# Patient Record
Sex: FEMALE | Race: WHITE | NOT HISPANIC OR LATINO | Employment: FULL TIME | URBAN - METROPOLITAN AREA
[De-identification: names, ages, dates, MRNs, and addresses within clinical notes are randomized per-mention and may not be internally consistent; named-entity substitution may affect disease eponyms.]

---

## 2017-01-02 ENCOUNTER — HOSPITAL ENCOUNTER (OUTPATIENT)
Dept: RADIOLOGY | Facility: CLINIC | Age: 52
Discharge: HOME | End: 2017-01-02
Attending: SURGERY

## 2017-01-02 DIAGNOSIS — Z41.9 SURGERY, ELECTIVE: ICD-10-CM

## 2017-01-03 ENCOUNTER — ANESTHESIA - HEALTHEAST (OUTPATIENT)
Dept: SURGERY | Facility: CLINIC | Age: 52
End: 2017-01-03

## 2017-01-03 ENCOUNTER — COMMUNICATION - HEALTHEAST (OUTPATIENT)
Dept: NEUROSURGERY | Facility: CLINIC | Age: 52
End: 2017-01-03

## 2017-01-03 ENCOUNTER — OFFICE VISIT - HEALTHEAST (OUTPATIENT)
Dept: INTERNAL MEDICINE | Facility: CLINIC | Age: 52
End: 2017-01-03

## 2017-01-03 DIAGNOSIS — Z01.818 PREOP EXAM FOR INTERNAL MEDICINE: ICD-10-CM

## 2017-01-03 DIAGNOSIS — F41.9 ANXIETY: ICD-10-CM

## 2017-01-03 DIAGNOSIS — G95.9 CERVICAL MYELOPATHY (H): ICD-10-CM

## 2017-01-03 ASSESSMENT — MIFFLIN-ST. JEOR: SCORE: 1712.19

## 2017-01-04 ENCOUNTER — SURGERY - HEALTHEAST (OUTPATIENT)
Dept: SURGERY | Facility: CLINIC | Age: 52
End: 2017-01-04

## 2017-01-04 LAB
ATRIAL RATE - MUSE: 64 BPM
DIASTOLIC BLOOD PRESSURE - MUSE: NORMAL MMHG
INTERPRETATION ECG - MUSE: NORMAL
P AXIS - MUSE: 62 DEGREES
PR INTERVAL - MUSE: 144 MS
QRS DURATION - MUSE: 86 MS
QT - MUSE: 446 MS
QTC - MUSE: 460 MS
R AXIS - MUSE: 59 DEGREES
SYSTOLIC BLOOD PRESSURE - MUSE: NORMAL MMHG
T AXIS - MUSE: 30 DEGREES
VENTRICULAR RATE- MUSE: 64 BPM

## 2017-01-04 ASSESSMENT — MIFFLIN-ST. JEOR
SCORE: 1698.29
SCORE: 1709.92

## 2017-01-05 ASSESSMENT — MIFFLIN-ST. JEOR: SCORE: 1710.37

## 2017-01-06 ENCOUNTER — COMMUNICATION - HEALTHEAST (OUTPATIENT)
Dept: NEUROSURGERY | Facility: CLINIC | Age: 52
End: 2017-01-06

## 2017-01-12 ENCOUNTER — COMMUNICATION - HEALTHEAST (OUTPATIENT)
Dept: NEUROSURGERY | Facility: CLINIC | Age: 52
End: 2017-01-12

## 2017-01-12 DIAGNOSIS — G95.9 CERVICAL MYELOPATHY (H): ICD-10-CM

## 2017-01-12 RX ORDER — CYCLOBENZAPRINE HCL 10 MG
5 TABLET ORAL 3 TIMES DAILY PRN
Qty: 30 TABLET | Refills: 0 | Status: SHIPPED | OUTPATIENT
Start: 2017-01-12 | End: 2024-09-20

## 2017-01-20 ENCOUNTER — COMMUNICATION - HEALTHEAST (OUTPATIENT)
Dept: NEUROSURGERY | Facility: CLINIC | Age: 52
End: 2017-01-20

## 2017-01-20 DIAGNOSIS — G95.20 CERVICAL SPINAL CORD COMPRESSION (H): ICD-10-CM

## 2017-01-23 ENCOUNTER — AMBULATORY - HEALTHEAST (OUTPATIENT)
Dept: NEUROSURGERY | Facility: CLINIC | Age: 52
End: 2017-01-23

## 2017-01-23 DIAGNOSIS — Z48.02 REMOVAL OF STAPLES: ICD-10-CM

## 2017-01-23 DIAGNOSIS — G95.9 CERVICAL MYELOPATHY (H): ICD-10-CM

## 2017-01-23 RX ORDER — ACETAMINOPHEN 500 MG
1000 TABLET ORAL EVERY 6 HOURS PRN
Status: SHIPPED | COMMUNITY
Start: 2017-01-23 | End: 2024-09-20

## 2017-02-03 ENCOUNTER — COMMUNICATION - HEALTHEAST (OUTPATIENT)
Dept: NEUROSURGERY | Facility: CLINIC | Age: 52
End: 2017-02-03

## 2017-02-03 DIAGNOSIS — G95.20 CERVICAL CORD COMPRESSION WITH MYELOPATHY (H): ICD-10-CM

## 2017-02-13 ENCOUNTER — HOSPITAL ENCOUNTER (OUTPATIENT)
Dept: RADIOLOGY | Facility: CLINIC | Age: 52
Discharge: HOME | End: 2017-02-13
Attending: SURGERY

## 2017-02-13 ENCOUNTER — OFFICE VISIT - HEALTHEAST (OUTPATIENT)
Dept: NEUROSURGERY | Facility: CLINIC | Age: 52
End: 2017-02-13

## 2017-02-13 DIAGNOSIS — M48.02 CERVICAL STENOSIS OF SPINE: ICD-10-CM

## 2017-02-13 DIAGNOSIS — G95.9 CERVICAL MYELOPATHY (H): ICD-10-CM

## 2017-02-13 RX ORDER — IBUPROFEN 200 MG
600 TABLET ORAL EVERY 4 HOURS PRN
Status: SHIPPED | COMMUNITY
Start: 2017-02-13

## 2017-02-13 ASSESSMENT — MIFFLIN-ST. JEOR: SCORE: 1664.56

## 2017-02-21 ENCOUNTER — OFFICE VISIT - HEALTHEAST (OUTPATIENT)
Dept: PHYSICAL THERAPY | Facility: REHABILITATION | Age: 52
End: 2017-02-21

## 2017-02-21 DIAGNOSIS — M62.81 GENERALIZED MUSCLE WEAKNESS: ICD-10-CM

## 2017-02-21 DIAGNOSIS — M54.2 NECK PAIN, ACUTE: ICD-10-CM

## 2017-02-28 ENCOUNTER — COMMUNICATION - HEALTHEAST (OUTPATIENT)
Dept: NEUROSURGERY | Facility: CLINIC | Age: 52
End: 2017-02-28

## 2017-02-28 ENCOUNTER — OFFICE VISIT - HEALTHEAST (OUTPATIENT)
Dept: PHYSICAL THERAPY | Facility: REHABILITATION | Age: 52
End: 2017-02-28

## 2017-02-28 DIAGNOSIS — M62.81 GENERALIZED MUSCLE WEAKNESS: ICD-10-CM

## 2017-02-28 DIAGNOSIS — M54.2 NECK PAIN, ACUTE: ICD-10-CM

## 2017-03-01 ENCOUNTER — OFFICE VISIT - HEALTHEAST (OUTPATIENT)
Dept: INTERNAL MEDICINE | Facility: CLINIC | Age: 52
End: 2017-03-01

## 2017-03-01 DIAGNOSIS — Z00.00 HEALTH CARE MAINTENANCE: ICD-10-CM

## 2017-03-01 DIAGNOSIS — F41.1 GENERALIZED ANXIETY DISORDER: ICD-10-CM

## 2017-03-01 ASSESSMENT — MIFFLIN-ST. JEOR: SCORE: 1661.84

## 2017-03-02 ENCOUNTER — OFFICE VISIT - HEALTHEAST (OUTPATIENT)
Dept: PHYSICAL THERAPY | Facility: REHABILITATION | Age: 52
End: 2017-03-02

## 2017-03-02 DIAGNOSIS — M62.81 GENERALIZED MUSCLE WEAKNESS: ICD-10-CM

## 2017-03-02 DIAGNOSIS — M54.2 NECK PAIN, ACUTE: ICD-10-CM

## 2017-03-03 ENCOUNTER — COMMUNICATION - HEALTHEAST (OUTPATIENT)
Dept: NEUROSURGERY | Facility: CLINIC | Age: 52
End: 2017-03-03

## 2017-03-09 ENCOUNTER — OFFICE VISIT - HEALTHEAST (OUTPATIENT)
Dept: PHYSICAL THERAPY | Facility: REHABILITATION | Age: 52
End: 2017-03-09

## 2017-03-09 DIAGNOSIS — M62.81 MUSCLE WEAKNESS (GENERALIZED): ICD-10-CM

## 2017-03-09 DIAGNOSIS — M54.2 NECK PAIN, ACUTE: ICD-10-CM

## 2017-03-09 DIAGNOSIS — M54.16 LUMBAR RADICULOPATHY: ICD-10-CM

## 2017-03-09 DIAGNOSIS — M62.81 GENERALIZED MUSCLE WEAKNESS: ICD-10-CM

## 2017-03-14 ENCOUNTER — OFFICE VISIT - HEALTHEAST (OUTPATIENT)
Dept: PHYSICAL THERAPY | Facility: REHABILITATION | Age: 52
End: 2017-03-14

## 2017-03-14 DIAGNOSIS — M62.81 MUSCLE WEAKNESS (GENERALIZED): ICD-10-CM

## 2017-03-14 DIAGNOSIS — M54.16 LUMBAR RADICULOPATHY: ICD-10-CM

## 2017-03-14 DIAGNOSIS — M62.81 GENERALIZED MUSCLE WEAKNESS: ICD-10-CM

## 2017-03-14 DIAGNOSIS — M54.2 NECK PAIN, ACUTE: ICD-10-CM

## 2017-03-16 ENCOUNTER — OFFICE VISIT - HEALTHEAST (OUTPATIENT)
Dept: PHYSICAL THERAPY | Facility: REHABILITATION | Age: 52
End: 2017-03-16

## 2017-03-16 DIAGNOSIS — M53.86 DECREASED ROM OF LUMBAR SPINE: ICD-10-CM

## 2017-03-16 DIAGNOSIS — M54.2 NECK PAIN, ACUTE: ICD-10-CM

## 2017-03-16 DIAGNOSIS — M54.50 ACUTE LOW BACK PAIN DUE TO TRAUMA: ICD-10-CM

## 2017-03-16 DIAGNOSIS — M62.81 MUSCLE WEAKNESS (GENERALIZED): ICD-10-CM

## 2017-03-16 DIAGNOSIS — M62.830 SPASM OF LUMBAR PARASPINOUS MUSCLE: ICD-10-CM

## 2017-03-16 DIAGNOSIS — M53.3 SACROILIAC DYSFUNCTION: ICD-10-CM

## 2017-03-16 DIAGNOSIS — M54.16 LUMBAR RADICULOPATHY: ICD-10-CM

## 2017-03-16 DIAGNOSIS — M62.81 GENERALIZED MUSCLE WEAKNESS: ICD-10-CM

## 2017-03-16 DIAGNOSIS — M79.18 MYOFASCIAL PAIN: ICD-10-CM

## 2017-03-16 DIAGNOSIS — G89.11 ACUTE LOW BACK PAIN DUE TO TRAUMA: ICD-10-CM

## 2017-03-22 ENCOUNTER — COMMUNICATION - HEALTHEAST (OUTPATIENT)
Dept: INTERNAL MEDICINE | Facility: CLINIC | Age: 52
End: 2017-03-22

## 2017-06-27 ENCOUNTER — COMMUNICATION - HEALTHEAST (OUTPATIENT)
Dept: SCHEDULING | Facility: CLINIC | Age: 52
End: 2017-06-27

## 2017-06-28 ENCOUNTER — OFFICE VISIT - HEALTHEAST (OUTPATIENT)
Dept: INTERNAL MEDICINE | Facility: CLINIC | Age: 52
End: 2017-06-28

## 2017-06-28 DIAGNOSIS — F41.1 GENERALIZED ANXIETY DISORDER: ICD-10-CM

## 2017-06-28 DIAGNOSIS — G95.9 CERVICAL MYELOPATHY (H): ICD-10-CM

## 2017-06-28 DIAGNOSIS — J45.909 ASTHMA: ICD-10-CM

## 2017-07-10 ENCOUNTER — COMMUNICATION - HEALTHEAST (OUTPATIENT)
Dept: SCHEDULING | Facility: CLINIC | Age: 52
End: 2017-07-10

## 2017-07-10 ENCOUNTER — COMMUNICATION - HEALTHEAST (OUTPATIENT)
Dept: INTERNAL MEDICINE | Facility: CLINIC | Age: 52
End: 2017-07-10

## 2017-07-17 ENCOUNTER — COMMUNICATION - HEALTHEAST (OUTPATIENT)
Dept: INTERNAL MEDICINE | Facility: CLINIC | Age: 52
End: 2017-07-17

## 2017-07-17 DIAGNOSIS — G47.9 SLEEP DIFFICULTIES: ICD-10-CM

## 2017-07-31 ENCOUNTER — COMMUNICATION - HEALTHEAST (OUTPATIENT)
Dept: INTERNAL MEDICINE | Facility: CLINIC | Age: 52
End: 2017-07-31

## 2017-07-31 DIAGNOSIS — F41.9 ANXIETY: ICD-10-CM

## 2017-08-04 ENCOUNTER — RECORDS - HEALTHEAST (OUTPATIENT)
Dept: ADMINISTRATIVE | Facility: OTHER | Age: 52
End: 2017-08-04

## 2017-08-18 ENCOUNTER — COMMUNICATION - HEALTHEAST (OUTPATIENT)
Dept: INTERNAL MEDICINE | Facility: CLINIC | Age: 52
End: 2017-08-18

## 2017-11-03 ENCOUNTER — OFFICE VISIT - HEALTHEAST (OUTPATIENT)
Dept: INTERNAL MEDICINE | Facility: CLINIC | Age: 52
End: 2017-11-03

## 2017-11-03 DIAGNOSIS — Z12.31 VISIT FOR SCREENING MAMMOGRAM: ICD-10-CM

## 2017-11-03 DIAGNOSIS — B19.20 HEPATITIS C INFECTION: ICD-10-CM

## 2017-11-03 DIAGNOSIS — G47.9 SLEEP DIFFICULTIES: ICD-10-CM

## 2017-11-03 DIAGNOSIS — R50.9 FEBRILE ILLNESS, ACUTE: ICD-10-CM

## 2017-11-03 DIAGNOSIS — J45.909 ASTHMA: ICD-10-CM

## 2017-11-03 ASSESSMENT — MIFFLIN-ST. JEOR: SCORE: 1684.06

## 2017-11-06 ENCOUNTER — COMMUNICATION - HEALTHEAST (OUTPATIENT)
Dept: SCHEDULING | Facility: CLINIC | Age: 52
End: 2017-11-06

## 2017-11-06 DIAGNOSIS — J40 BRONCHITIS: ICD-10-CM

## 2017-11-06 LAB
HCV AB SERPL QL IA: POSITIVE
HCV RNA DETECT/QUANT, S: ABNORMAL IU/ML

## 2017-11-09 LAB — HCV GENOTYPE, S - HISTORICAL: ABNORMAL

## 2017-11-10 ENCOUNTER — AMBULATORY - HEALTHEAST (OUTPATIENT)
Dept: INTERNAL MEDICINE | Facility: CLINIC | Age: 52
End: 2017-11-10

## 2017-11-10 DIAGNOSIS — B19.20 HEPATITIS C: ICD-10-CM

## 2017-12-04 ENCOUNTER — RECORDS - HEALTHEAST (OUTPATIENT)
Dept: ADMINISTRATIVE | Facility: OTHER | Age: 52
End: 2017-12-04

## 2017-12-07 ENCOUNTER — RECORDS - HEALTHEAST (OUTPATIENT)
Dept: ADMINISTRATIVE | Facility: OTHER | Age: 52
End: 2017-12-07

## 2017-12-08 ENCOUNTER — HOSPITAL ENCOUNTER (OUTPATIENT)
Dept: ULTRASOUND IMAGING | Facility: CLINIC | Age: 52
Discharge: HOME | End: 2017-12-08
Attending: INTERNAL MEDICINE

## 2017-12-08 DIAGNOSIS — B18.2 CHRONIC HEPATITIS C WITHOUT HEPATIC COMA (H): ICD-10-CM

## 2017-12-11 ENCOUNTER — RECORDS - HEALTHEAST (OUTPATIENT)
Dept: ADMINISTRATIVE | Facility: OTHER | Age: 52
End: 2017-12-11

## 2018-02-12 ENCOUNTER — COMMUNICATION - HEALTHEAST (OUTPATIENT)
Dept: INTERNAL MEDICINE | Facility: CLINIC | Age: 53
End: 2018-02-12

## 2018-02-12 DIAGNOSIS — F41.9 ANXIETY: ICD-10-CM

## 2018-02-12 RX ORDER — LORAZEPAM 0.5 MG/1
TABLET ORAL
Qty: 45 TABLET | Refills: 0 | Status: SHIPPED | OUTPATIENT
Start: 2018-02-12 | End: 2024-09-20

## 2018-02-24 ENCOUNTER — RECORDS - HEALTHEAST (OUTPATIENT)
Dept: ADMINISTRATIVE | Facility: OTHER | Age: 53
End: 2018-02-24

## 2018-03-02 ENCOUNTER — OFFICE VISIT - HEALTHEAST (OUTPATIENT)
Dept: INTERNAL MEDICINE | Facility: CLINIC | Age: 53
End: 2018-03-02

## 2018-03-02 DIAGNOSIS — M62.838 MUSCLE SPASM: ICD-10-CM
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
## 2018-03-02 DIAGNOSIS — M54.41 ACUTE MIDLINE LOW BACK PAIN WITH RIGHT-SIDED SCIATICA: ICD-10-CM

## 2018-03-02 DIAGNOSIS — F41.9 ANXIETY: ICD-10-CM

## 2018-03-02 DIAGNOSIS — M53.3 SACROILIAC JOINT PAIN: ICD-10-CM

## 2018-03-02 RX ORDER — VENLAFAXINE 50 MG/1
50 TABLET ORAL 2 TIMES DAILY
Refills: 0 | Status: SHIPPED
Start: 2018-03-02 | End: 2024-09-20

## 2018-03-02 RX ORDER — CYCLOBENZAPRINE HCL 10 MG
10 TABLET ORAL
Status: SHIPPED | COMMUNITY
Start: 2018-02-24 | End: 2024-09-20

## 2018-03-02 RX ORDER — HYDROCODONE BITARTRATE AND ACETAMINOPHEN 5; 325 MG/1; MG/1
TABLET ORAL
Refills: 0 | Status: SHIPPED | COMMUNITY
Start: 2018-02-24 | End: 2024-09-20

## 2018-03-07 ENCOUNTER — OFFICE VISIT - HEALTHEAST (OUTPATIENT)
Dept: PHYSICAL THERAPY | Facility: REHABILITATION | Age: 53
End: 2018-03-07

## 2018-03-07 DIAGNOSIS — M53.86 DECREASED ROM OF LUMBAR SPINE: ICD-10-CM

## 2018-03-07 DIAGNOSIS — M54.6 ACUTE RIGHT-SIDED THORACIC BACK PAIN: ICD-10-CM

## 2018-03-07 DIAGNOSIS — M62.81 GENERALIZED MUSCLE WEAKNESS: ICD-10-CM

## 2018-03-07 DIAGNOSIS — M54.41 ACUTE RIGHT-SIDED LOW BACK PAIN WITH RIGHT-SIDED SCIATICA: ICD-10-CM

## 2018-03-07 DIAGNOSIS — Z87.828 HISTORY OF MOTOR VEHICLE ACCIDENT: ICD-10-CM

## 2018-03-07 DIAGNOSIS — M54.2 ACUTE NECK PAIN: ICD-10-CM

## 2018-03-21 ENCOUNTER — COMMUNICATION - HEALTHEAST (OUTPATIENT)
Dept: INTERNAL MEDICINE | Facility: CLINIC | Age: 53
End: 2018-03-21

## 2018-03-21 DIAGNOSIS — M62.838 MUSCLE SPASM: ICD-10-CM

## 2018-03-21 DIAGNOSIS — M53.3 SACROILIAC JOINT PAIN: ICD-10-CM

## 2018-03-21 DIAGNOSIS — M54.41 ACUTE MIDLINE LOW BACK PAIN WITH RIGHT-SIDED SCIATICA: ICD-10-CM

## 2018-03-21 RX ORDER — TRAMADOL HYDROCHLORIDE 50 MG/1
50 TABLET ORAL EVERY 8 HOURS PRN
Qty: 20 TABLET | Refills: 0 | Status: SHIPPED | OUTPATIENT
Start: 2018-03-21 | End: 2024-09-20

## 2018-04-02 ENCOUNTER — OFFICE VISIT - HEALTHEAST (OUTPATIENT)
Dept: PHYSICAL THERAPY | Facility: REHABILITATION | Age: 53
End: 2018-04-02

## 2018-04-02 DIAGNOSIS — M54.41 ACUTE RIGHT-SIDED LOW BACK PAIN WITH RIGHT-SIDED SCIATICA: ICD-10-CM

## 2018-04-02 DIAGNOSIS — M53.86 DECREASED ROM OF LUMBAR SPINE: ICD-10-CM

## 2018-04-02 DIAGNOSIS — Z87.828 HISTORY OF MOTOR VEHICLE ACCIDENT: ICD-10-CM

## 2018-04-02 DIAGNOSIS — M54.2 ACUTE NECK PAIN: ICD-10-CM

## 2018-04-02 DIAGNOSIS — M54.6 ACUTE RIGHT-SIDED THORACIC BACK PAIN: ICD-10-CM

## 2018-04-02 DIAGNOSIS — M62.81 GENERALIZED MUSCLE WEAKNESS: ICD-10-CM

## 2018-08-24 ENCOUNTER — COMMUNICATION - HEALTHEAST (OUTPATIENT)
Dept: INTERNAL MEDICINE | Facility: CLINIC | Age: 53
End: 2018-08-24

## 2018-10-15 ENCOUNTER — COMMUNICATION - HEALTHEAST (OUTPATIENT)
Dept: INTERNAL MEDICINE | Facility: CLINIC | Age: 53
End: 2018-10-15

## 2018-11-21 ENCOUNTER — COMMUNICATION - HEALTHEAST (OUTPATIENT)
Dept: INTERNAL MEDICINE | Facility: CLINIC | Age: 53
End: 2018-11-21

## 2018-11-21 DIAGNOSIS — G47.9 SLEEP DIFFICULTIES: ICD-10-CM

## 2019-01-05 ENCOUNTER — COMMUNICATION - HEALTHEAST (OUTPATIENT)
Dept: INTERNAL MEDICINE | Facility: CLINIC | Age: 54
End: 2019-01-05

## 2019-01-05 DIAGNOSIS — G47.9 SLEEP DIFFICULTIES: ICD-10-CM

## 2019-01-16 ENCOUNTER — COMMUNICATION - HEALTHEAST (OUTPATIENT)
Dept: INTERNAL MEDICINE | Facility: CLINIC | Age: 54
End: 2019-01-16

## 2019-01-16 DIAGNOSIS — F41.9 ANXIETY: ICD-10-CM

## 2019-01-18 RX ORDER — LORAZEPAM 1 MG/1
1 TABLET ORAL AT BEDTIME
Qty: 60 TABLET | Refills: 0 | Status: SHIPPED | OUTPATIENT
Start: 2019-01-18 | End: 2024-09-20

## 2019-02-27 ENCOUNTER — COMMUNICATION - HEALTHEAST (OUTPATIENT)
Dept: INTERNAL MEDICINE | Facility: CLINIC | Age: 54
End: 2019-02-27

## 2019-02-27 DIAGNOSIS — G47.9 SLEEP DIFFICULTIES: ICD-10-CM

## 2019-04-07 ENCOUNTER — COMMUNICATION - HEALTHEAST (OUTPATIENT)
Dept: INTERNAL MEDICINE | Facility: CLINIC | Age: 54
End: 2019-04-07

## 2019-04-07 DIAGNOSIS — F41.1 GENERALIZED ANXIETY DISORDER: ICD-10-CM

## 2019-04-08 RX ORDER — VENLAFAXINE 100 MG/1
100 TABLET ORAL DAILY
Qty: 90 TABLET | Refills: 0 | Status: SHIPPED | OUTPATIENT
Start: 2019-04-08 | End: 2024-09-20

## 2019-06-28 ENCOUNTER — COMMUNICATION - HEALTHEAST (OUTPATIENT)
Dept: INTERNAL MEDICINE | Facility: CLINIC | Age: 54
End: 2019-06-28

## 2019-06-28 DIAGNOSIS — G47.9 SLEEP DIFFICULTIES: ICD-10-CM

## 2019-06-28 RX ORDER — TRAZODONE HYDROCHLORIDE 50 MG/1
TABLET, FILM COATED ORAL
Qty: 180 TABLET | Refills: 1 | Status: SHIPPED | OUTPATIENT
Start: 2019-06-28

## 2019-08-25 ENCOUNTER — COMMUNICATION - HEALTHEAST (OUTPATIENT)
Dept: INTERNAL MEDICINE | Facility: CLINIC | Age: 54
End: 2019-08-25

## 2019-08-25 DIAGNOSIS — F41.1 GENERALIZED ANXIETY DISORDER: ICD-10-CM

## 2021-04-01 ENCOUNTER — RECORDS - HEALTHEAST (OUTPATIENT)
Dept: LAB | Facility: CLINIC | Age: 56
End: 2021-04-01

## 2021-04-01 LAB
SARS-COV-2 PCR COMMENT: NORMAL
SARS-COV-2 RNA SPEC QL NAA+PROBE: NEGATIVE
SARS-COV-2 VIRUS SPECIMEN SOURCE: NORMAL

## 2021-04-08 ENCOUNTER — RECORDS - HEALTHEAST (OUTPATIENT)
Dept: LAB | Facility: CLINIC | Age: 56
End: 2021-04-08

## 2021-04-18 ENCOUNTER — RECORDS - HEALTHEAST (OUTPATIENT)
Dept: LAB | Facility: CLINIC | Age: 56
End: 2021-04-18

## 2021-04-22 ENCOUNTER — RECORDS - HEALTHEAST (OUTPATIENT)
Dept: LAB | Facility: CLINIC | Age: 56
End: 2021-04-22

## 2021-04-30 ENCOUNTER — RECORDS - HEALTHEAST (OUTPATIENT)
Dept: LAB | Facility: CLINIC | Age: 56
End: 2021-04-30

## 2021-05-27 NOTE — TELEPHONE ENCOUNTER
Former patient of Jeniffer & has not established care with another provider.  Please assign refill request to covering provider per Clinic standard process.      RN cannot approve Refill Request    RN can NOT refill this medication PCP messaged that patient is overdue for Labs and Office Visit.       Madelyn Davis, Care Connection Triage/Med Refill 4/8/2019    Requested Prescriptions   Pending Prescriptions Disp Refills     venlafaxine (EFFEXOR) 25 MG tablet [Pharmacy Med Name: VENLAFAXINE HCL 25 MG TABLET] 120 tablet 2     Sig: TAKE 2 TABLET BY MOUTH DAILY FOR 5 DAYS, THEN TAKE 4 TABLETS DAILY       Venlafaxine/Desvenlafaxine Refill Protocol Failed - 4/7/2019  8:30 AM        Failed - LFT or AST or ALT in last year     Albumin   Date Value Ref Range Status   11/03/2017 3.2 (L) 3.5 - 5.0 g/dL Final     Bilirubin, Total   Date Value Ref Range Status   11/03/2017 0.4 0.0 - 1.0 mg/dL Final     Bilirubin, Direct   Date Value Ref Range Status   11/03/2017 0.2 <=0.5 mg/dL Final     Alkaline Phosphatase   Date Value Ref Range Status   11/03/2017 106 45 - 120 U/L Final     AST   Date Value Ref Range Status   11/03/2017 94 (H) 0 - 40 U/L Final     ALT   Date Value Ref Range Status   11/03/2017 145 (H) 0 - 45 U/L Final     Protein, Total   Date Value Ref Range Status   11/03/2017 7.2 6.0 - 8.0 g/dL Final                Failed - Fasting lipid cascade in last year     Cholesterol   Date Value Ref Range Status   02/18/2015 183 <=200 mg/dL Final     Triglycerides   Date Value Ref Range Status   02/18/2015 106 0 - 150 mg/dL Final     HDL Cholesterol   Date Value Ref Range Status   02/18/2015 79 >=40 mg/dL Final     LDL Calculated   Date Value Ref Range Status   02/18/2015 83 0 - 130 mg/dL Final             Failed - PCP or prescribing provider visit in last year     Last office visit with prescriber/PCP: 6/28/2017 Janeth Swift MD OR same dept: Visit date not found OR same specialty: 3/2/2018 Lolis Dillard MD  Last  physical: 1/3/2017 Last MTM visit: Visit date not found   Next visit within 3 mo: Visit date not found  Next physical within 3 mo: Visit date not found  Prescriber OR PCP: Janeth Swift MD  Last diagnosis associated with med order: There are no diagnoses linked to this encounter.  If protocol passes may refill for 12 months if within 3 months of last provider visit (or a total of 15 months).             Failed - Blood Pressure in last year     BP Readings from Last 1 Encounters:   03/02/18 134/84

## 2021-05-30 VITALS — HEIGHT: 68 IN | WEIGHT: 223.4 LBS | BODY MASS INDEX: 33.86 KG/M2

## 2021-05-30 VITALS — WEIGHT: 234.5 LBS | HEIGHT: 68 IN | BODY MASS INDEX: 35.54 KG/M2

## 2021-05-30 VITALS — WEIGHT: 234.1 LBS | HEIGHT: 68 IN | BODY MASS INDEX: 35.48 KG/M2

## 2021-05-30 VITALS — BODY MASS INDEX: 33.95 KG/M2 | WEIGHT: 224 LBS | HEIGHT: 68 IN

## 2021-05-30 NOTE — TELEPHONE ENCOUNTER
RN cannot approve Refill Request    RN can NOT refill this medication PCP messaged that patient is overdue for Office Visit. Last office visit: Visit date not found Last Physical: Visit date not found Last MTM visit: Visit date not found Last visit same specialty: 3/2/2018 Lolis Dillard MD.  Next visit within 3 mo: Visit date not found  Next physical within 3 mo: Visit date not found      Rina Bates, [facility] Connection Triage/Med Refill 6/28/2019    Requested Prescriptions   Pending Prescriptions Disp Refills     traZODone (DESYREL) 50 MG tablet 180 tablet 0     Sig: TAKE ONE TO TWO TABS AT BEDTIME AS NEEDED FOR INSOMNIA.       Tricyclics/Misc Antidepressant/Antianxiety Meds Refill Protocol Failed - 6/28/2019 12:58 PM        Failed - PCP or prescribing provider visit in last year     Last office visit with prescriber/PCP: Visit date not found OR same dept: Visit date not found OR same specialty: 3/2/2018 Lolis Dillard MD  Last physical: Visit date not found Last MTM visit: Visit date not found   Next visit within 3 mo: Visit date not found  Next physical within 3 mo: Visit date not found  Prescriber OR PCP: Steve Pedro MD  Last diagnosis associated with med order: 1. Sleep difficulties  - traZODone (DESYREL) 50 MG tablet; TAKE ONE TO TWO TABS AT BEDTIME AS NEEDED FOR INSOMNIA.  Dispense: 180 tablet; Refill: 0    If protocol passes may refill for 12 months if within 3 months of last provider visit (or a total of 15 months).

## 2021-05-31 VITALS — BODY MASS INDEX: 33.97 KG/M2 | HEIGHT: 68 IN

## 2021-05-31 VITALS — HEIGHT: 68 IN | WEIGHT: 228.3 LBS | BODY MASS INDEX: 34.6 KG/M2

## 2021-05-31 VITALS — WEIGHT: 212.5 LBS | BODY MASS INDEX: 32.31 KG/M2

## 2021-05-31 NOTE — TELEPHONE ENCOUNTER
Former patient of Jeniffer & has not established care with another provider.  Please assign refill request to covering provider per Clinic standard process.      RN cannot approve Refill Request    RN can NOT refill this medication Protocol failed and NO refill given.        Madelyn Davis, [facility] Connection Triage/Med Refill 8/26/2019    Requested Prescriptions   Pending Prescriptions Disp Refills     venlafaxine (EFFEXOR) 100 MG tablet [Pharmacy Med Name: VENLAFAXINE  MG TABLET] 90 tablet 0     Sig: TAKE 1 TABLET BY MOUTH EVERY DAY       Venlafaxine/Desvenlafaxine Refill Protocol Failed - 8/25/2019  5:58 PM        Failed - LFT or AST or ALT in last year     Albumin   Date Value Ref Range Status   11/03/2017 3.2 (L) 3.5 - 5.0 g/dL Final     Bilirubin, Total   Date Value Ref Range Status   11/03/2017 0.4 0.0 - 1.0 mg/dL Final     Bilirubin, Direct   Date Value Ref Range Status   11/03/2017 0.2 <=0.5 mg/dL Final     Alkaline Phosphatase   Date Value Ref Range Status   11/03/2017 106 45 - 120 U/L Final     AST   Date Value Ref Range Status   11/03/2017 94 (H) 0 - 40 U/L Final     ALT   Date Value Ref Range Status   11/03/2017 145 (H) 0 - 45 U/L Final     Protein, Total   Date Value Ref Range Status   11/03/2017 7.2 6.0 - 8.0 g/dL Final                Failed - Fasting lipid cascade in last year     Cholesterol   Date Value Ref Range Status   02/18/2015 183 <=200 mg/dL Final     Triglycerides   Date Value Ref Range Status   02/18/2015 106 0 - 150 mg/dL Final     HDL Cholesterol   Date Value Ref Range Status   02/18/2015 79 >=40 mg/dL Final     LDL Calculated   Date Value Ref Range Status   02/18/2015 83 0 - 130 mg/dL Final             Failed - PCP or prescribing provider visit in last year     Last office visit with prescriber/PCP: Visit date not found OR same dept: Visit date not found OR same specialty: 3/2/2018 Lolis Dillard MD  Last physical: Visit date not found Last MTM visit: Visit date not found    Next visit within 3 mo: Visit date not found  Next physical within 3 mo: Visit date not found  Prescriber OR PCP: Govind Cunningham DO  Last diagnosis associated with med order: 1. Generalized anxiety disorder  - venlafaxine (EFFEXOR) 100 MG tablet [Pharmacy Med Name: VENLAFAXINE  MG TABLET]; TAKE 1 TABLET BY MOUTH EVERY DAY  Dispense: 90 tablet; Refill: 0    If protocol passes may refill for 12 months if within 3 months of last provider visit (or a total of 15 months).             Failed - Blood Pressure in last year     BP Readings from Last 1 Encounters:   03/02/18 134/84
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
## 2021-06-08 NOTE — ANESTHESIA CARE TRANSFER NOTE
Last vitals:   Vitals:    01/04/17 1445   BP: 111/70   Pulse: (P) 68   Resp: (P) 14   Temp: 37  C (98.6  F)   SpO2: 100%     Patient's level of consciousness is drowsy  Spontaneous respirations: yes  Maintains airway independently: yes  Dentition unchanged: yes  Oropharynx: oropharynx clear of all foreign objects    QCDR Measures:  ASA# 20 - Surgical Safety Checklist: ASA20A - Safety Checks Done  PQRS# 430 - Adult PONV Prevention: 4558F - Pt received => 2 anti-emetic agents (different classes) preop & intraop  ASA# 8 - Peds PONV Prevention: NA - Not pediatric patient, not GA or 2 or more risk factors NOT present  PQRS# 424 - Isabela-op Temp Management: 4559F - At least one body temp DOCUMENTED => 35.5C or 95.9F within required timeframe  PQRS# 426 - PACU Transfer Protocol: - Transfer of care checklist used  ASA# 14 - Acute Post-op Pain: ASA14B - Patient did NOT experience pain >= 7 out of 10    I completed my SBAR handoff to the receiving nurse per policy and procedure.

## 2021-06-08 NOTE — PROGRESS NOTES
CHART NOTE     1965     DATE OF SERVICE: 2017     FOLLOW UP EVALUATION:      Hiral Mejia is status post Laminoplasty by Dr. Garay on 2017    HPI:  Patient initially presented with pain  located in the neck and going out into the shoulders and down the arms, weakness in her arms and hands. She drops things. She has trouble with her legs. She has trouble with balance and trouble walking. She has numbness and tingling in both arms from the shoulders down to the fingers. On imaging she had stenosis above and belove a previous fusion w cord compression at C 4-5 and C 6-7 and symptoms of myelopathy      Today, Hiral Mejia reports neck pain much improved from a week ago.  Residual numbness and tingling right hand.  No weakness.  She takes Flexeril at noc and Ibuprofen. She has not returned to work.  She works as DON. Questions regarding restarting activity, weaning collar and starting therapy.        PAST MEDICAL HISTORY, SURGICAL HISTORY, REVIEW OF SYMPTOMS, MEDICATIONS AND ALLERGIES:  Past medical history, surgical history, review of symptoms, medications and allergies remain unchanged.    Vitals:    17 1226   BP: 108/77   Pulse: 78   SpO2: 97%        PHYSICAL EXAM:  Neurological exam reveals:  Alert and oriented x3, speech fluent and appropriate.   PERRL, EOMI, face symmetric, tongue midline, shoulder shrug equal  No pronator drift, finger to nose smooth and accurate bilaterally  Arm strength bilateral grasp, biceps, triceps, and deltoids 5/5   Leg strength bilateral dorsiflexion, plantar flexion, and hip flexion 5/5  Muscle Bulk and tone wnl.   Reflexes:No pathological reflexes   Gait and station:Normal  Incision:healing well   KENDALL: 38%  PE      RADIOGRAPHIC IMAGING: I personally reviewed any new diagnostic studies. There is good alignment and position of hardware        ASSESSMENT AND PLAN:Hiral Mejia is status post laminoplasty for cervical stenosis and associated myelopathy.  I  would anticipate residual N&T, I explained to her that she will most likely see improvement over the next several months.  She recalls Dr Garay explaining that symptoms may not completely resolve.  Her pain is better, she has stiffness and decreased ROM associated w deconditioning. I provided instructions for weaning from collar & activity, she will start PT after weaned.  She's est'd w [facility]. She will remain off work for now. I provided work slip through March 1, 2017.  On or before that date, she will call for return to work instructions- I anticipate that she will return to work part-time ( 4hrs) x 1 week , 6hrs x 1 week, then to full-time.  I told her that we would provide refill of Flexeril, as I anticipate that she may need in association w therapy and activity increase.

## 2021-06-08 NOTE — ANESTHESIA PROCEDURE NOTES
Arterial Line  Reason for Procedure: hemodynamic monitoring  Patient location during procedure: Pre-op  Start time: 1/4/2017 7:18 AM  End time: 1/4/2017 7:25 AM  Staffing:  Performing  Anesthesiologist: DEAN CABRERA  Sterile Precautions:  sterile barriers used during insertion: cap, mask, sterile gloves, large sheet, and hand hygiene used.  Arterial Line:   Immediately prior to procedure a time out was called to verify the correct patient, procedure, equipment, support staff and site/side marked as required  Laterality: right  Location: radial  Prepped with: ChloroPrep    Needle gauge: 20 G  Number of Attempts: 1  Secured with: tape, transparent dressing and pressure dressing  Flushed with: saline  1% lidocaine local anesthesia used for skin prep.   See MAR for additional medications given.  Ultrasound evaluation of access site: yes  Vessel patent by US exam    Concurrent real time visualization of needle entry

## 2021-06-08 NOTE — ANESTHESIA POSTPROCEDURE EVALUATION
Patient: Hiral Mejia  CERVICAL LAMINOPLASTY C4-5-6-7 CUT RIGHT, HINGE LEFT, PLUSE FORAMINOTOMIES C4-5 AND C6-7 BILATERAL   Anesthesia type: general    Patient location: PACU  Last vitals:   Vitals:    01/04/17 1515   BP: (P) 107/55   Pulse: (P) 70   Resp: (P) 14   Temp:    SpO2: (P) 100%     Post vital signs: stable  Level of consciousness: awake and responds to simple questions  Post-anesthesia pain: pain controlled  Post-anesthesia nausea and vomiting: no  Pulmonary: unassisted, return to baseline  Cardiovascular: stable and blood pressure at baseline  Hydration: adequate  Anesthetic events: no    QCDR Measures:  ASA# 11 - Isabela-op Cardiac Arrest: ASA11B - Patient did NOT experience unanticipated cardiac arrest  ASA# 12 - Isabela-op Mortality Rate: ASA12B - Patient did NOT die  ASA# 13 - PACU Re-Intubation Rate: ASA13B - Patient did NOT require a new airway mgmt  ASA# 10 - Composite Anes Safety: ASA10A - No serious adverse event  ASA# 38 - New Corneal Injury: ASA38A - No new exposure keratitis or corneal abrasion in PACU    Additional Notes:

## 2021-06-09 NOTE — PROGRESS NOTES
[facility] Daily Progress     Patient Name: Hiral Mejia  Date: 2017  Visit #: 2  PTA visit #:  na  Referral Diagnosis: Cervical stenosis of spine [M48.02]  - Primary   Referring provider: Katy Nguyen,   Visit Diagnosis:     ICD-10-CM    1. Neck pain, acute M54.2    2. Generalized muscle weakness M62.81          Assessment:   Pt is s/p CERVICAL LAMINOPLASTY C4-5-6-7 CUT RIGHT, HINGE LEFT, PLUSE FORAMINOTOMIES C4-5 AND C6-7 BILATERAL on 17.    RESTRICTIONS: add 5# lifting per week (after 6 weeks post-op)    HEP/POC compliance is  good .  Patient demonstrates understanding/independence with home program.  Response to Intervention Pt was in increased pain last week that has improved. Able to start lifting program and progress scapular strengthening.  Patient is benefitting from skilled physical therapy and is making steady progress toward functional goals.  Patient is appropriate to continue with skilled physical therapy intervention, as indicated by initial plan of care.    Goal Status: on-going  Pt. will be independent with home exercise program in : 4 weeks (to self-manage pain and improve function)  Pt. will have improved quality of sleep: waking less times/night;in 12 weeks  Patient will work: without restrictions;in 12 weeks (with less than 4/10 pain)  Patient [place]: for driving;with partial ROM;with less pain;in 4 weeks (>50 deg of cervical rotation qi and less than 4/10 pain)  Patient will decrease : NDI score;for improved quality of function;for improved quality of life;in 12 weeks;by _ points  by ___ points: >5 pts    Plan / Patient Education:     Continue with initial plan of care.  Progress with home program as tolerated.    Subjective:     Pain Ratin-5  Pt had a tension HA on the R last week that lasted for 3-4 days. She was taking advil and muscle relaxer. She feels she might overdue it at times at home that will increase her pain. Pt has not been using the  cervical collar for the last few days. Ex 1-3x/day      Objective:     Cervical AROM:  Flexion: mod  Ext:severe  Rotation: R 33  L 34  Lateral flexion: R mod L mod    Treatment Today     TREATMENT MINUTES COMMENTS   Evaluation     Self-care/ Home management     Manual therapy     Neuromuscular Re-education     Therapeutic Activity     Therapeutic Exercises 25 -see exercise flow sheet  -educated on lifting restrictions and how to progress at home   Gait training     Modality__________________                Total 25    Blank areas are intentional and mean the treatment did not include these items.       Amanda Callahan, PT, DPT  2/28/2017

## 2021-06-09 NOTE — PROGRESS NOTES
[facility] Daily Progress     Patient Name: Hiral Mejia  Date: 3/14/2017  Visit #: 5  PTA visit #:  na  Referral Diagnosis: Cervical stenosis of spine [M48.02]  - Primary   Referring provider: Katy Nguyen,   Visit Diagnosis:     ICD-10-CM    1. Neck pain, acute M54.2    2. Generalized muscle weakness M62.81    3. Lumbar radiculopathy M54.16    4. Muscle weakness (generalized) M62.81          Assessment:   Pt is s/p CERVICAL LAMINOPLASTY C4-5-6-7 CUT RIGHT, HINGE LEFT, PLUSE FORAMINOTOMIES C4-5 AND C6-7 BILATERAL on 17.    RESTRICTIONS: add 5# lifting per week (after 6 weeks post-op)    HEP/POC compliance is  good .  Patient demonstrates understanding/independence with home program.  Response to Intervention Pt reported less pain after MT. Increased pain today likely from returning to work and increased activity level. Overall, still making significant improvements in PT.  Patient is benefitting from skilled physical therapy and is making steady progress toward functional goals.  Patient is appropriate to continue with skilled physical therapy intervention, as indicated by initial plan of care.    Goal Status: on-going  Pt. will be independent with home exercise program in : 4 weeks (to self-manage pain and improve function)  Pt. will have improved quality of sleep: waking less times/night;in 12 weeks  Patient will work: without restrictions;in 12 weeks (with less than 4/10 pain)  Patient Turn Head: for driving;with partial ROM;with less pain;in 4 weeks (>50 deg of cervical rotation qi and less than 4/10 pain)  Patient will decrease : NDI score;for improved quality of function;for improved quality of life;in 12 weeks;by _ points  by ___ points: >5 pts    Plan / Patient Education:     Continue with initial plan of care.  Progress with home program as tolerated.    Subjective:     Pain Ratin/10 qi upper thoracic spine and C1-2  Pt has been driving more and went to work yesterday. Her  neck is more flared now. Very tight and achy. Has tried the elliptical once.   Ex 1-3x/day      Objective:     Cervical AROM:  Flexion: 3 fingers from chest   Ext: mod- with pain  Rotation: R 40  L 45   Lateral flexion: R mod L mod    Pt reported less stiffness and pain at end of session    Treatment Today     TREATMENT MINUTES COMMENTS   Evaluation     Self-care/ Home management     Manual therapy 20 -prone STM to qi upper thoracic paraspinals and upper trapezius  -prone central and unilateral PAs to T1-T6, grade III-IV, 5x10 sec oscillations, x3 reps   Neuromuscular Re-education     Therapeutic Activity     Therapeutic Exercises 8 -see exercise flow sheet  -UBE x4 F/B, WL 2.0  -educated on progress; assessed objective measures  -continued to educate on slow return to prior LOF with regards to exercise/activity level   Gait training     Modality__________________                Total 28    Blank areas are intentional and mean the treatment did not include these items.       Amanda Callahan, PT, DPT  3/14/2017

## 2021-06-09 NOTE — PROGRESS NOTES
"[facility] Daily Progress     Patient Name: Hiral Mejia  Date: 3/9/2017  Visit #: 4  PTA visit #:  na  Referral Diagnosis: Cervical stenosis of spine [M48.02]  - Primary   Referring provider: Katy Nguyen,   Visit Diagnosis:     ICD-10-CM    1. Neck pain, acute M54.2    2. Generalized muscle weakness M62.81    3. Lumbar radiculopathy M54.16    4. Muscle weakness (generalized) M62.81          Assessment:   Pt is s/p CERVICAL LAMINOPLASTY C4-5-6-7 CUT RIGHT, HINGE LEFT, PLUSE FORAMINOTOMIES C4-5 AND C6-7 BILATERAL on 1/4/17.    RESTRICTIONS: add 5# lifting per week (after 6 weeks post-op)    HEP/POC compliance is  good .  Patient demonstrates understanding/independence with home program.  Response to Intervention Pt is tolerating advancements in exercises and doing more at home. She has increased cervical AROM with less pain. Overall, she is making steady progress with PT.  Patient is benefitting from skilled physical therapy and is making steady progress toward functional goals.  Patient is appropriate to continue with skilled physical therapy intervention, as indicated by initial plan of care.    Goal Status: on-going  Pt. will be independent with home exercise program in : 4 weeks (to self-manage pain and improve function)  Pt. will have improved quality of sleep: waking less times/night;in 12 weeks  Patient will work: without restrictions;in 12 weeks (with less than 4/10 pain)  Patient Turn Head: for driving;with partial ROM;with less pain;in 4 weeks (>50 deg of cervical rotation qi and less than 4/10 pain)  Patient will decrease : NDI score;for improved quality of function;for improved quality of life;in 12 weeks;by _ points  by ___ points: >5 pts    Plan / Patient Education:     Continue with initial plan of care.  Progress with home program as tolerated.    Subjective:     Pain Rating:  \"more soreness than pain\"; L side is tight and one \"spot\" in her neck  Pt feels like she is getting " "better everyday. She still feels limited in what she can do physically- is able to work through the discomfort. Has added arm circles and doing more stretching. Has started using the 8# weight at home. Pt is starting work next week for 4 hours a day.   Ex 1-3x/day      Objective:     Cervical AROM:  Flexion: 3 fingers from chest   Ext: mod- \"feels better\"  Rotation: R 43 (35 deg at end)  L 45 (45 at end of session)  Lateral flexion: R mod L mod    Treatment Today     TREATMENT MINUTES COMMENTS   Evaluation     Self-care/ Home management     Manual therapy     Neuromuscular Re-education     Therapeutic Activity     Therapeutic Exercises 25 -see exercise flow sheet  -UBE x4 F/B, WL 2.0  -verbal review of HEP  -empty can x20 reps, x10 reps no weight (not added to HEP)  -educated on return to elliptical with slow progression  -educated on correct seated posture   Gait training     Modality__________________                Total 25    Blank areas are intentional and mean the treatment did not include these items.       Amanda Callahan, PT, DPT  3/9/2017  "

## 2021-06-09 NOTE — PROGRESS NOTES
"[facility] Daily Progress     Patient Name: Hiral Mejia  Date: 3/2/2017  Visit #: 3  PTA visit #:  na  Referral Diagnosis: Cervical stenosis of spine [M48.02]  - Primary   Referring provider: Katy Nguyen,   Visit Diagnosis:     ICD-10-CM    1. Neck pain, acute M54.2    2. Generalized muscle weakness M62.81          Assessment:   Pt is s/p CERVICAL LAMINOPLASTY C4-5-6-7 CUT RIGHT, HINGE LEFT, PLUSE FORAMINOTOMIES C4-5 AND C6-7 BILATERAL on 1/4/17.    RESTRICTIONS: add 5# lifting per week (after 6 weeks post-op)    HEP/POC compliance is  good .  Patient demonstrates understanding/independence with home program.  Response to Intervention Able to progress weight with lifting to 6#. Pain continues to decrease and cervical AROM is improving.  Patient is benefitting from skilled physical therapy and is making steady progress toward functional goals.  Patient is appropriate to continue with skilled physical therapy intervention, as indicated by initial plan of care.    Goal Status: on-going  Pt. will be independent with home exercise program in : 4 weeks (to self-manage pain and improve function)  Pt. will have improved quality of sleep: waking less times/night;in 12 weeks  Patient will work: without restrictions;in 12 weeks (with less than 4/10 pain)  Patient Turn Head: for driving;with partial ROM;with less pain;in 4 weeks (>50 deg of cervical rotation qi and less than 4/10 pain)  Patient will decrease : NDI score;for improved quality of function;for improved quality of life;in 12 weeks;by _ points  by ___ points: >5 pts    Plan / Patient Education:     Continue with initial plan of care.  Progress with home program as tolerated.    Subjective:     Pain Rating: 3 \"more soreness than pain\"; L side is tight and one \"spot\" in her neck  Pt was sore the afternoon after her last appt. She is able to do all the exercises but is sore. Feels her ROM is improving.   Ex 1-3x/day      Objective:     Cervical " AROM:  Flexion: 5 fingers from chest  Ext: mod  Rotation: R 25 (35 deg at end)  L 40 (45 at end of session)  Lateral flexion: R mod L mod    Treatment Today     TREATMENT MINUTES COMMENTS   Evaluation     Self-care/ Home management     Manual therapy 15 -STM to qi cervical paraspinals, occiputs and upper trapezius   Neuromuscular Re-education     Therapeutic Activity     Therapeutic Exercises 10 -see exercise flow sheet  -UBE x4 F, WL 1.5  -verbal review of HEP   Gait training     Modality__________________                Total 25    Blank areas are intentional and mean the treatment did not include these items.       Amanda Callahan, PT, DPT  3/2/2017
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
## 2021-06-09 NOTE — PROGRESS NOTES
[facility] Daily Progress     Patient Name: Hiral Mejia  Date: 3/16/2017  Visit #: 6  PTA visit #:  1  Referral Diagnosis: Cervical stenosis of spine [M48.02]  - Primary   Referring provider: Katy Nguyen,   Visit Diagnosis:     ICD-10-CM    1. Neck pain, acute M54.2    2. Generalized muscle weakness M62.81    3. Lumbar radiculopathy M54.16    4. Muscle weakness (generalized) M62.81    5. Decreased ROM of lumbar spine M25.60    6. Spasm of lumbar paraspinous muscle M62.830    7. Acute low back pain due to trauma M54.5    8. Myofascial pain M79.1    9. Sacroiliac dysfunction M53.3          Assessment:   Pt is s/p CERVICAL LAMINOPLASTY C4-5-6-7 CUT RIGHT, HINGE LEFT, PLUSE FORAMINOTOMIES C4-5 AND C6-7 BILATERAL on 1/4/17.    RESTRICTIONS: add 5# lifting per week (after 6 weeks post-op)    HEP/POC compliance is  good .  Patient demonstrates understanding/independence with home program.  Response to Intervention Pt reported less pain after MT. Increased pain today likely from returning to work and increased activity level. Overall, still making significant improvements in PT.  Patient is benefitting from skilled physical therapy and is making steady progress toward functional goals.  Patient is appropriate to continue with skilled physical therapy intervention, as indicated by initial plan of care.    Goal Status: on-going  Pt. will be independent with home exercise program in : 4 weeks (to self-manage pain and improve function)  Pt. will have improved quality of sleep: waking less times/night;in 12 weeks  Patient will work: without restrictions;in 12 weeks (with less than 4/10 pain)  Patient Turn Head: for driving;with partial ROM;with less pain;in 4 weeks (>50 deg of cervical rotation qi and less than 4/10 pain)  Patient will decrease : NDI score;for improved quality of function;for improved quality of life;in 12 weeks;by _ points  by ___ points: >5 pts    Plan / Patient Education:      Continue with initial plan of care.  Progress with home program as tolerated.    Subjective:     Pain Ratin/10 qi upper thoracic spine and C1-2  Pt has been driving more and went to work yesterday. Her neck is more flared now. Very tight and achy pain has increased since starting back to work part -time   Ex 1-3x/day      Objective:     Reports increased pain since starting back to work  Pt reported less stiffness and pain at end of session    Treatment Today     TREATMENT MINUTES COMMENTS   Evaluation     Self-care/ Home management     Manual therapy 20 -prone STM to qi upper thoracic paraspinals and upper trapezius  -prone central and unilateral PAs to T1-T6   Neuromuscular Re-education     Therapeutic Activity     Therapeutic Exercises 8 -see exercise flow sheet  -UBE x4 F/B, WL 2.0  -educated on progress; assessed objective measures  -continued to educate on slow return to prior LOF with regards to exercise/activity level   Gait training     Modality__________________                Total 28    Blank areas are intentional and mean the treatment did not include these items.       Maximo Landis, PT, DPT  3/16/2017

## 2021-06-10 NOTE — PROGRESS NOTES
[facility] Discharge Summary  Patient Name: Hiral Mejia  Date: 5/15/2017  Referral Diagnosis: Cervical stenosis of spine [M48.02]  - Primary   Referring provider: Katy Nguyen,   Visit Diagnosis:   1. Neck pain, acute     2. Generalized muscle weakness     3. Lumbar radiculopathy     4. Muscle weakness (generalized)     5. Decreased ROM of lumbar spine     6. Spasm of lumbar paraspinous muscle     7. Acute low back pain due to trauma     8. Myofascial pain     9. Sacroiliac dysfunction         Goals: NOT MET  Pt. will be independent with home exercise program in : 4 weeks (to self-manage pain and improve function)  Pt. will have improved quality of sleep: waking less times/night;in 12 weeks  Patient will work: without restrictions;in 12 weeks (with less than 4/10 pain)  Patient Turn Head: for driving;with partial ROM;with less pain;in 4 weeks (>50 deg of cervical rotation qi and less than 4/10 pain)  Patient will decrease : NDI score;for improved quality of function;for improved quality of life;in 12 weeks;by _ points  by ___ points: >5 pts    Patient was seen for 6 visits from 2/21/17 to 3/16/17 with 0 missed appointments.  The patient attended therapy initially, but did not finish the therapy sessions prescribed.  Goals were not fully achieved. Explanation for goals not achieved: Unable to formally assess progress towards goals as pt did not return to PT.  Patient received a home program cervical and scapular strengthening/ROM  The patient discontinued therapy, did not return.    Therapy will be discontinued at this time.  The patient will need a new referral to resume.    Thank you for your referral.  Amanda Callahan, PT, DPT  5/15/2017  9:14 AM

## 2021-06-11 NOTE — PROGRESS NOTES
"[facility] Follow Up Note    Hiral Mejia   51 y.o. female    Date of Visit: 2017    Chief Complaint   Patient presents with     Back Pain     Shoulder Pain     Medication Management     Subjective  Hiral comes in today she has been having worsening neck and shoulder pain.  She had surgery on  and feels that she is recuperated fairly well but has had increased stress in her life which she seems to carry in her neck.  She would like to do some massage therapy on a regular basis as she finds it helpful.  She did not start on the duloxetine that I had suggested last visit for increased anxiety in addition to her sertraline.  She was having so much anxiety and pain yesterday today and tomorrow she is unable to work.    ROS A comprehensive review of systems was performed and was otherwise negative    Social History:   Social History     Social History Narrative    Hiral has a same sex spouse (Aysha) and was  in .  Her first partner  on a vacation that she was supposed to be attending (but had just started a new job) in [place] in .  She has a son (Sebastien) born in 2014 (Aysha gave birth).  She has a 14-year-old stepdaughter.  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 She had a rough childhood and there was one incident of IV drug use (with her mother) and previous history of other substance abuse- all now in remission.       Medications were reconciled.  Allergies, social and family history, and the problem list were all reviewed and updated.      Exam  General Appearance: Pleasant and alert  Vitals:    17 0904   BP: 122/70   Pulse: 82   Height: 5' 8\" (1.727 m)      There is no height or weight on file to calculate BMI.  Wt Readings from Last 3 Encounters:   17 (!) 223 lb 6.4 oz (101.3 kg)   17 (!) 224 lb (101.6 kg)   17 (!) 234 lb 1.6 oz (106.2 kg)     HEENT: Sclera are clear.   Lungs: Normal " respirations  Abdomen: Soft and nondistended  Extremities: No edema  Skin: No rashes  Neuro: Moves all extremities and has facial symmetry  Gait: Ambulates with a normal gait    Assessment/Plan  1. Generalized anxiety disorder  Started on duloxetine as planned, 20 mg daily.    2. Cervical myelopathy  Massage therapy as ordered.  - Massage therapy    3.  Allergies  Is having worsening allergies and needs a refill on her fluticasone.  - fluticasone (FLONASE) 50 mcg/actuation nasal spray; 2 sprays into each nostril daily as needed for allergies.  Dispense: 16 g; Refill: prn          Janeth Swift MD  6/28/2017    Much or all of the text in this note was generated through the use of Dragon Dictate voice-to-text software. Errors in spelling or words which seem out of context are unintentional. Sound alike errors, in particular, may have escaped editing.

## 2021-06-13 NOTE — PROGRESS NOTES
"ASSESSMENT:  1.  Febrile illness: Influenza smear is negative.  Symptoms seem viral in etiology.  Supportive management is advised    2.  History of abnormal liver enzymes:  Hiral that she tested positive for hepatitis C.  I do not see a viral load study on the chart to see if disease is active.  She does have \"fatty infiltration \"noted by ultrasound.  Studies for hepatitis C will be reassessed.    3.  Health maintenance: She is interested in a screening mammogram    PLAN:  1.  Influenza smear was done.  This returned negative  2.  Lab evaluation for hepatitis C as below.  If viral load is significant, genotype would be checked in consideration of antiviral therapy  3.  Supportive therapy for current illness.  She should remain off work until symptoms improve.  A work slip was filled out cover until 11/6.  4.  Screening mammogram    Orders Placed This Encounter   Procedures     Influenza A/B Rapid Test     Mammo Screening Bilateral     Standing Status:   Future     Standing Expiration Date:   2/3/2019     Order Specific Question:   Patient's previous breast density:     Answer:   Scattered fibroglandular density [2]     Order Specific Question:   Is the patient pregnant?     Answer:   No     Order Specific Question:   Can the procedure be changed per Radiologist protocol?     Answer:   Yes     Hepatitis C Antibody (Anti-HCV)     Hepatitis C Virus (HCV) RNA Detection and Quantification by RT-PCR ($$$)     Hepatic Profile     HM1 (CBC with Diff)     Medications Discontinued During This Encounter   Medication Reason     traZODone (DESYREL) 50 MG tablet Reorder     fluticasone (FLONASE) 50 mcg/actuation nasal spray Reorder       No Follow-up on file.    ASSESSED PROBLEMS:  1. Visit for screening mammogram  Mammo Screening Bilateral   2. Sleep difficulties  traZODone (DESYREL) 50 MG tablet   3. Asthma  fluticasone (FLONASE) 50 mcg/actuation nasal spray   4. Hepatitis C infection  Hepatitis C Antibody (Anti-HCV)    " Hepatitis C Virus (HCV) RNA Detection and Quantification by RT-PCR ($$$)    Hepatic Profile   5. Febrile illness, acute  Influenza A/B Rapid Test    HM1(CBC and Differential)    HM1 (CBC with Diff)       CHIEF COMPLAINT:  Chief Complaint   Patient presents with     Fever     reaching 102      Fatigue     Flu Symptoms     body aches, sore throat and ear pain, chest discomfort.        HISTORY OF PRESENT ILLNESS:  Hiral is a 51 y.o. female presenting to the clinic today with complaints of a fever.     Fever: She felt fatigued at the beginning of the week and has continued to feel progressively worse throughout the week. She has had chills and a fever as high as 102 degrees. She missed work yesterday and slept for most of the day. She feels slightly better today than yesterday, and her temperature was a little lower this morning. She has been taking Nelli Benton Cold and Flu. Her influenza test is negative and her white blood cell count is normal today.     Hepatitis C: She has hepatitis C and has not been treated for it. She was diagnosed in the late 1990's and has not done anything about it in a while. She is not sure of the genotype. She thinks she had an ultrasound recently. They thought that she cleared the infection on her own in the past, but she did not. She is fine with doing labs today to check on this.     Asthma: She has had trouble with asthma in the past, but she uses her inhaler very infrequently. It is well controlled. Her chest feels a little tight, but she does not have burning in the chest.     Insomnia: She either takes 50 mg or 100 mg of trazodone nightly before bed. She tries to limit it to one tablet, but she occasionally needs two.     Health Maintenance: She did not get the flu shot this year. She is due for a mammogram and a colonoscopy.     REVIEW OF SYSTEMS:   She denies pain with urination. Her neck is a little sore and she has some tenderness in the sinuses. Her stomach has not bothered  "her at all. All other systems are negative.    PFSH:  She has two kids.     TOBACCO USE:  History   Smoking Status     Former Smoker     Quit date: 2/18/1992   Smokeless Tobacco     Never Used       VITALS:  Vitals:    11/03/17 1516   BP: 126/78   Patient Site: Left Arm   Patient Position: Sitting   Cuff Size: Adult Regular   Pulse: 64   Resp: 16   Weight: (!) 228 lb 4.8 oz (103.6 kg)   Height: 5' 8\" (1.727 m)     Wt Readings from Last 3 Encounters:   11/03/17 (!) 228 lb 4.8 oz (103.6 kg)   03/01/17 (!) 223 lb 6.4 oz (101.3 kg)   02/13/17 (!) 224 lb (101.6 kg)     Body mass index is 34.71 kg/(m^2).    PHYSICAL EXAM:  Constitutional:   Reveals an alert, pleasant, talkative woman. Affect appropriate. Does not appear in any acute distress.  Vitals: per nursing notes.  HEENT: Mild tenderness to tapping of sinuses. Eyes:  No conjunctival hyperemia  Oropharynx:   Mouth and throat clear, no thrush or exudate.  Nose: Mild congestion  Neck:  Supple, no carotid bruits or adenopathy.  Back:  No spine or CVA pain.  Thorax:  No bony deformities.  Lungs: Clear to A&P without rales or wheezes.  Respiratory effort normal.  Cardiac:   Regular rate and rhythm, normal S1, S2, no murmur or gallop.  Abdomen:  Soft, active bowel sounds without bruits, mass, or tenderness.  Neuro:  Alert and oriented.  No gross focal deficits.  Psychiatric:  Memory intact, mood appropriate.    ADDITIONAL HISTORY SUMMARIZED (2): None.  DECISION TO OBTAIN EXTRA INFORMATION (1): None.   RADIOLOGY TESTS (1): Reviewed 10/7/2016 ultrasound. Mammogram ordered.   LABS (1): Labs ordered and reviewed.   MEDICINE TESTS (1): None.  INDEPENDENT REVIEW (2 each): None.     The visit lasted a total of 20 minutes face to face with the patient. Over 50% of the time was spent counseling and educating the patient about her fever.    Uday GUERRERO, am scribing for and in the presence of, Dr. Collazo.    MALCOM GUERRERO, personally performed the services described in this " documentation, as scribed by Uday Nieto in my presence, and it is both accurate and complete.    Dragon dictation was used for this note.  Speech recognition errors are a possibility.    MEDICATIONS:  Current Outpatient Prescriptions   Medication Sig Dispense Refill     acetaminophen (TYLENOL) 500 MG tablet Take 1,000 mg by mouth every 6 (six) hours as needed for pain.       albuterol (VENTOLIN HFA) 90 mcg/actuation inhaler Inhale 1 puff every 4 (four) hours as needed for wheezing. 1 Inhaler prn     cyclobenzaprine (FLEXERIL) 10 MG tablet Take 0.5 tablets (5 mg total) by mouth 3 (three) times a day as needed for muscle spasms (May take full tab at HS if desired.). 30 tablet 0     DULoxetine (CYMBALTA) 20 MG capsule Take 1 capsule (20 mg total) by mouth daily. 90 capsule 3     fluticasone (FLONASE) 50 mcg/actuation nasal spray 2 sprays into each nostril daily as needed for allergies. 16 g prn     ibuprofen (ADVIL) 200 MG tablet Take by mouth.       LORazepam (ATIVAN) 0.5 MG tablet TAKE ONE TABLET BY MOUTH EVERY SIX HOURS AS NEEDED 45 tablet 0     multivitamin therapeutic (THERAGRAN) tablet Take 1 tablet by mouth daily.       senna-docusate (PERICOLACE) 8.6-50 mg tablet Take 1 tablet by mouth 2 (two) times a day.  0     sertraline (ZOLOFT) 100 MG tablet Take 1.5 tablets (150 mg total) by mouth daily. 120 tablet 3     traZODone (DESYREL) 50 MG tablet One to two tabs at bedtime as needed for insomnia 180 tablet 3     No current facility-administered medications for this visit.        Total data points: 2

## 2021-06-15 PROBLEM — G95.9 CERVICAL MYELOPATHY (H): Status: ACTIVE | Noted: 2017-01-04
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
## 2021-06-22 NOTE — TELEPHONE ENCOUNTER
Refill Approved    Rx renewed per Medication Renewal Policy. Medication was last renewed on 11/22/18.    Madelyn Davis, [facility] Triage/Med Refill 1/6/2019     Requested Prescriptions   Pending Prescriptions Disp Refills     traZODone (DESYREL) 50 MG tablet [Pharmacy Med Name: TRAZODONE 50 MG TABLET] 180 tablet 0     Sig: TAKE ONE TO TWO TABS AT BEDTIME AS NEEDED FOR INSOMNIA.    Tricyclics/Misc Antidepressant/Antianxiety Meds Refill Protocol Passed - 1/5/2019  9:06 AM       Passed - PCP or prescribing provider visit in last year    Last office visit with prescriber/PCP: 3/2/2018 Lolis Dillard MD OR same dept: 3/2/2018 Lolis Dillard MD OR same specialty: 3/2/2018 Lolis Dillard MD  Last physical: Visit date not found Last MTM visit: Visit date not found   Next visit within 3 mo: Visit date not found  Next physical within 3 mo: Visit date not found  Prescriber OR PCP: Lolis Dillard MD  Last diagnosis associated with med order: 1. Sleep difficulties  - traZODone (DESYREL) 50 MG tablet [Pharmacy Med Name: TRAZODONE 50 MG TABLET]; TAKE ONE TO TWO TABS AT BEDTIME AS NEEDED FOR INSOMNIA.  Dispense: 180 tablet; Refill: 0    If protocol passes may refill for 12 months if within 3 months of last provider visit (or a total of 15 months).

## 2021-06-23 NOTE — TELEPHONE ENCOUNTER
LMTCB Please relay message below    Per Dr Dee last note pt is to get her Ativan through the psychiatrist

## 2021-06-23 NOTE — TELEPHONE ENCOUNTER
Patient Returning Call  Reason for call:  Patient called back.  Information relayed to patient:  Informed of message listed below.  Patient states her psychiatrist moved out of state and she has not gotten another one as of yet. Patient states she would like Dr Collazo to fill until she finds another psychiatrist again.  Patient has additional questions:  Yes  If YES, what are your questions/concerns:  As above.  Okay to leave a detailed message?: Yes

## 2021-06-23 NOTE — TELEPHONE ENCOUNTER
Controlled Substance Refill Request  Medication:   Requested Prescriptions     Pending Prescriptions Disp Refills     LORazepam (ATIVAN) 1 MG tablet 60 tablet 0     Sig: Take 1 tablet (1 mg total) by mouth at bedtime.     Date Last Fill: 8/24/18  Pharmacy: [facility] [id number]  [phone]    Submit electronically to pharmacy  Controlled Substance Agreement on File:   Encounter-Level CSA Scan Date - 10/06/2016:    Scan on 10/10/2016  7:42 AM (below)             Encounter-Level CSA Scan Date - 01/19/2016:    Scan on 1/20/2016 12:52 PM (below)         Last office visit: 6/28/2017 Janeth Swift MD    '

## 2021-06-24 NOTE — TELEPHONE ENCOUNTER
Former patient of Jeniffer & has not established care with another provider.  Please assign refill request to covering provider per Clinic standard process.      Refill Approved    Rx renewed per Medication Renewal Policy. Medication was last renewed on 1/6/19.    Madelyn Davis, Care Connection Triage/Med Refill 2/27/2019     Requested Prescriptions   Pending Prescriptions Disp Refills     traZODone (DESYREL) 50 MG tablet [Pharmacy Med Name: TRAZODONE 50 MG TABLET] 180 tablet 0     Sig: TAKE ONE TO TWO TABS AT BEDTIME AS NEEDED FOR INSOMNIA.    Tricyclics/Misc Antidepressant/Antianxiety Meds Refill Protocol Passed - 2/27/2019 12:19 PM       Passed - PCP or prescribing provider visit in last year    Last office visit with prescriber/PCP: 3/2/2018 Lolis Dillard MD OR same dept: 3/2/2018 Lolis Dillard MD OR same specialty: 3/2/2018 Lolis Dillard MD  Last physical: Visit date not found Last MTM visit: Visit date not found   Next visit within 3 mo: Visit date not found  Next physical within 3 mo: Visit date not found  Prescriber OR PCP: Lolis Dillard MD  Last diagnosis associated with med order: 1. Sleep difficulties  - traZODone (DESYREL) 50 MG tablet [Pharmacy Med Name: TRAZODONE 50 MG TABLET]; TAKE ONE TO TWO TABS AT BEDTIME AS NEEDED FOR INSOMNIA.  Dispense: 180 tablet; Refill: 0    If protocol passes may refill for 12 months if within 3 months of last provider visit (or a total of 15 months).

## 2021-06-25 ENCOUNTER — RECORDS - HEALTHEAST (OUTPATIENT)
Dept: LAB | Facility: CLINIC | Age: 56
End: 2021-06-25

## 2021-07-02 ENCOUNTER — RECORDS - HEALTHEAST (OUTPATIENT)
Dept: LAB | Facility: CLINIC | Age: 56
End: 2021-07-02

## 2021-07-03 NOTE — ADDENDUM NOTE
Addendum Note by Lc Delcid MD at 11/7/2017  1:36 PM     Author: Lc Delcid MD Service: -- Author Type: Physician    Filed: 11/7/2017  1:36 PM Encounter Date: 11/3/2017 Status: Signed    : Lc Delcid MD (Physician)    Addended by: LC DELCID on: 11/7/2017 01:36 PM        Modules accepted: Orders
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
## 2021-07-03 NOTE — ADDENDUM NOTE
Addendum Note by Hakan Bell CNP at 1/12/2017  2:42 PM     Author: Hakan Bell CNP Service: -- Author Type: Nurse Practitioner    Filed: 1/12/2017  2:42 PM Encounter Date: 1/12/2017 Status: Signed    : Hakan Bell CNP (Nurse Practitioner)    Addended by: HAKAN BELL on: 1/12/2017 02:42 PM        Modules accepted: Orders

## 2021-07-04 ENCOUNTER — HEALTH MAINTENANCE LETTER (OUTPATIENT)
Age: 56
End: 2021-07-04

## 2021-07-31 ENCOUNTER — LAB REQUISITION (OUTPATIENT)
Dept: LAB | Facility: CLINIC | Age: 56
End: 2021-07-31
Payer: COMMERCIAL

## 2021-07-31 DIAGNOSIS — Z03.818 ENCOUNTER FOR OBSERVATION FOR SUSPECTED EXPOSURE TO OTHER BIOLOGICAL AGENTS RULED OUT: ICD-10-CM

## 2021-07-31 LAB — SARS-COV-2 RNA RESP QL NAA+PROBE: NEGATIVE

## 2021-07-31 PROCEDURE — U0003 INFECTIOUS AGENT DETECTION BY NUCLEIC ACID (DNA OR RNA); SEVERE ACUTE RESPIRATORY SYNDROME CORONAVIRUS 2 (SARS-COV-2) (CORONAVIRUS DISEASE [COVID-19]), AMPLIFIED PROBE TECHNIQUE, MAKING USE OF HIGH THROUGHPUT TECHNOLOGIES AS DESCRIBED BY CMS-2020-01-R: HCPCS | Mod: ORL

## 2021-07-31 PROCEDURE — U0005 INFEC AGEN DETEC AMPLI PROBE: HCPCS | Mod: ORL | Performed by: PHYSICIAN ASSISTANT

## 2021-08-09 ENCOUNTER — LAB REQUISITION (OUTPATIENT)
Dept: LAB | Facility: CLINIC | Age: 56
End: 2021-08-09
Payer: COMMERCIAL

## 2021-08-09 DIAGNOSIS — Z03.818 ENCOUNTER FOR OBSERVATION FOR SUSPECTED EXPOSURE TO OTHER BIOLOGICAL AGENTS RULED OUT: ICD-10-CM

## 2021-08-09 PROCEDURE — U0005 INFEC AGEN DETEC AMPLI PROBE: HCPCS | Mod: ORL | Performed by: PHYSICIAN ASSISTANT

## 2021-08-10 LAB — SARS-COV-2 RNA RESP QL NAA+PROBE: NEGATIVE

## 2021-10-24 ENCOUNTER — HEALTH MAINTENANCE LETTER (OUTPATIENT)
Age: 56
End: 2021-10-24

## 2022-07-31 ENCOUNTER — HEALTH MAINTENANCE LETTER (OUTPATIENT)
Age: 57
End: 2022-07-31

## 2022-10-15 ENCOUNTER — HEALTH MAINTENANCE LETTER (OUTPATIENT)
Age: 57
End: 2022-10-15

## 2023-08-20 ENCOUNTER — HEALTH MAINTENANCE LETTER (OUTPATIENT)
Age: 58
End: 2023-08-20

## 2024-08-22 ENCOUNTER — OFFICE VISIT (OUTPATIENT)
Dept: URGENT CARE | Facility: URGENT CARE | Age: 59
End: 2024-08-22
Payer: COMMERCIAL

## 2024-08-22 ENCOUNTER — ANCILLARY PROCEDURE (OUTPATIENT)
Dept: GENERAL RADIOLOGY | Facility: CLINIC | Age: 59
End: 2024-08-22
Attending: PHYSICIAN ASSISTANT
Payer: COMMERCIAL

## 2024-08-22 VITALS
SYSTOLIC BLOOD PRESSURE: 142 MMHG | OXYGEN SATURATION: 97 % | DIASTOLIC BLOOD PRESSURE: 90 MMHG | RESPIRATION RATE: 18 BRPM | BODY MASS INDEX: 36.34 KG/M2 | WEIGHT: 239 LBS | TEMPERATURE: 97.7 F | HEART RATE: 62 BPM

## 2024-08-22 DIAGNOSIS — M54.6 ACUTE BILATERAL THORACIC BACK PAIN: ICD-10-CM

## 2024-08-22 DIAGNOSIS — S06.0X0A CONCUSSION WITHOUT LOSS OF CONSCIOUSNESS, INITIAL ENCOUNTER: ICD-10-CM

## 2024-08-22 DIAGNOSIS — M54.2 NECK PAIN: Primary | ICD-10-CM

## 2024-08-22 DIAGNOSIS — M54.50 LUMBAR PAIN: ICD-10-CM

## 2024-08-22 PROCEDURE — 99204 OFFICE O/P NEW MOD 45 MIN: CPT | Performed by: PHYSICIAN ASSISTANT

## 2024-08-22 PROCEDURE — 72040 X-RAY EXAM NECK SPINE 2-3 VW: CPT | Mod: TC | Performed by: RADIOLOGY

## 2024-08-22 RX ORDER — METHYLPREDNISOLONE 4 MG
TABLET, DOSE PACK ORAL
Qty: 21 TABLET | Refills: 0 | Status: SHIPPED | OUTPATIENT
Start: 2024-08-22 | End: 2024-09-20

## 2024-08-22 RX ORDER — VENLAFAXINE HYDROCHLORIDE 150 MG/1
150 CAPSULE, EXTENDED RELEASE ORAL DAILY
COMMUNITY
Start: 2024-06-11

## 2024-08-22 RX ORDER — VENLAFAXINE HYDROCHLORIDE 37.5 MG/1
37.5 CAPSULE, EXTENDED RELEASE ORAL DAILY
COMMUNITY
Start: 2024-08-13 | End: 2024-09-20

## 2024-08-22 RX ORDER — METHOCARBAMOL 500 MG/1
500 TABLET, FILM COATED ORAL 3 TIMES DAILY
Qty: 15 TABLET | Refills: 0 | Status: SHIPPED | OUTPATIENT
Start: 2024-08-22

## 2024-08-22 RX ORDER — PROPRANOLOL HYDROCHLORIDE 20 MG/1
20 TABLET ORAL 3 TIMES DAILY PRN
COMMUNITY
Start: 2023-09-05 | End: 2024-09-20 | Stop reason: ALTCHOICE

## 2024-08-22 NOTE — PATIENT INSTRUCTIONS
General Tips for All Ages:    R.I.C.E Method:  Why: This helps reduce swelling and promotes healing.  What to Do:  Rest: Allow the injured area to rest.  Ice: Apply an ice pack for 15-20 minutes every 2-3 hours.  Compression: Use a compression bandage to control swelling.  Elevation: Elevate the injured limb to reduce swelling.    Over-the-Counter (OTC) Pain Relievers:  Why: Manages pain and reduces inflammation.  What to Use:  All Ages: Acetaminophen or ibuprofen as directed on the package.    Avoid H.A.R.M:  Why: To prevent further injury.  What to Avoid:  Heat: Avoid heat packs initially.  Alcohol: Can increase swelling.  Running: Until you receive clearance from a healthcare provider.  Massage: Avoid direct pressure on the injured area.    For Adults (18 Years and Older):    Physical Therapy (if prescribed):  Why: Helps restore strength and flexibility.  What to Do: Follow the prescribed exercises and attend therapy sessions.    Bracing/Taping (if prescribed):  Why: Provides support during recovery.  What to Do: Use braces or tape as directed by your healthcare provider.    For Adolescents (12-17 Years):    OTC Pain Relievers:  Why: Manages pain and reduces inflammation.  What to Use: Acetaminophen or ibuprofen as directed on the package.    Physical Therapy (if prescribed):  Why: Aids in regaining strength and flexibility.  What to Do: Follow the exercises given by your physical therapist.    For Children (Under 12 Years):    Pediatrician Consultation:  Why: Children's injuries may need specialized care.  What to Do: Consult your child's pediatrician for guidance.    OTC Pain Relievers (if approved by pediatrician):  Why: Manages pain and reduces inflammation.  What to Use: Follow your pediatrician's advice on using acetaminophen or ibuprofen.    All Ages:    Hydration and Nutrition:  Why: Essential for healing.  What to Do: Stay hydrated and ensure a balanced diet rich in vitamins and minerals.    Restorative  Sleep:  Why: Crucial for recovery.  What to Do: Ensure you get adequate and quality sleep.  Follow-Up:    Patient advised to return to clinic for reevaluation (either UC or PCP) if symptoms do not improve in 7 days. Patient educated on red flag symptoms and asked to go directly to the ED if these symptoms present themselves.     Remember, individual cases may vary, and it's crucial to follow your healthcare provider's advice. If you have concerns or if symptoms persist, don't hesitate to reach out for further guidance.
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
## 2024-08-26 ENCOUNTER — THERAPY VISIT (OUTPATIENT)
Dept: OCCUPATIONAL THERAPY | Facility: CLINIC | Age: 59
End: 2024-08-26
Attending: PHYSICIAN ASSISTANT
Payer: COMMERCIAL

## 2024-08-26 DIAGNOSIS — S06.0X0A CONCUSSION WITHOUT LOSS OF CONSCIOUSNESS, INITIAL ENCOUNTER: ICD-10-CM

## 2024-08-26 DIAGNOSIS — Z78.9 DECREASED ACTIVITIES OF DAILY LIVING (ADL): Primary | ICD-10-CM

## 2024-08-26 DIAGNOSIS — Z78.9 IMPAIRED INSTRUMENTAL ACTIVITIES OF DAILY LIVING (IADL): ICD-10-CM

## 2024-08-26 PROCEDURE — 97535 SELF CARE MNGMENT TRAINING: CPT | Mod: GO | Performed by: OCCUPATIONAL THERAPIST

## 2024-08-26 PROCEDURE — 97166 OT EVAL MOD COMPLEX 45 MIN: CPT | Mod: GO | Performed by: OCCUPATIONAL THERAPIST
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
## 2024-09-03 ENCOUNTER — THERAPY VISIT (OUTPATIENT)
Dept: OCCUPATIONAL THERAPY | Facility: CLINIC | Age: 59
End: 2024-09-03
Payer: COMMERCIAL

## 2024-09-03 DIAGNOSIS — Z78.9 DECREASED ACTIVITIES OF DAILY LIVING (ADL): ICD-10-CM

## 2024-09-03 DIAGNOSIS — S06.0X0A CONCUSSION WITHOUT LOSS OF CONSCIOUSNESS, INITIAL ENCOUNTER: Primary | ICD-10-CM

## 2024-09-03 DIAGNOSIS — Z78.9 IMPAIRED INSTRUMENTAL ACTIVITIES OF DAILY LIVING (IADL): ICD-10-CM

## 2024-09-03 PROCEDURE — 97110 THERAPEUTIC EXERCISES: CPT | Mod: GO | Performed by: OCCUPATIONAL THERAPIST

## 2024-09-03 PROCEDURE — 97535 SELF CARE MNGMENT TRAINING: CPT | Mod: GO | Performed by: OCCUPATIONAL THERAPIST

## 2024-09-16 ENCOUNTER — THERAPY VISIT (OUTPATIENT)
Dept: OCCUPATIONAL THERAPY | Facility: CLINIC | Age: 59
End: 2024-09-16
Payer: COMMERCIAL

## 2024-09-16 DIAGNOSIS — Z78.9 DECREASED ACTIVITIES OF DAILY LIVING (ADL): ICD-10-CM

## 2024-09-16 DIAGNOSIS — Z78.9 IMPAIRED INSTRUMENTAL ACTIVITIES OF DAILY LIVING (IADL): ICD-10-CM

## 2024-09-16 DIAGNOSIS — S06.0X0A CONCUSSION WITHOUT LOSS OF CONSCIOUSNESS, INITIAL ENCOUNTER: Primary | ICD-10-CM

## 2024-09-16 PROCEDURE — 97535 SELF CARE MNGMENT TRAINING: CPT | Mod: GO | Performed by: OCCUPATIONAL THERAPIST

## 2024-09-17 ENCOUNTER — THERAPY VISIT (OUTPATIENT)
Dept: PHYSICAL THERAPY | Facility: CLINIC | Age: 59
End: 2024-09-17
Attending: PHYSICIAN ASSISTANT
Payer: COMMERCIAL

## 2024-09-17 DIAGNOSIS — M54.2 NECK PAIN: ICD-10-CM

## 2024-09-17 DIAGNOSIS — S06.0X0A CONCUSSION WITHOUT LOSS OF CONSCIOUSNESS, INITIAL ENCOUNTER: Primary | ICD-10-CM

## 2024-09-17 PROCEDURE — 97110 THERAPEUTIC EXERCISES: CPT | Mod: GP

## 2024-09-17 PROCEDURE — 97112 NEUROMUSCULAR REEDUCATION: CPT | Mod: GP

## 2024-09-17 PROCEDURE — 97161 PT EVAL LOW COMPLEX 20 MIN: CPT | Mod: GP
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
## 2024-09-20 ENCOUNTER — OFFICE VISIT (OUTPATIENT)
Dept: NEUROLOGY | Facility: CLINIC | Age: 59
End: 2024-09-20
Payer: COMMERCIAL

## 2024-09-20 VITALS
DIASTOLIC BLOOD PRESSURE: 85 MMHG | HEIGHT: 68 IN | BODY MASS INDEX: 35.77 KG/M2 | SYSTOLIC BLOOD PRESSURE: 122 MMHG | WEIGHT: 236 LBS | HEART RATE: 67 BPM

## 2024-09-20 DIAGNOSIS — M47.812 CERVICAL SPONDYLOSIS WITHOUT MYELOPATHY: Primary | ICD-10-CM

## 2024-09-20 DIAGNOSIS — G43.009 MIGRAINE WITHOUT AURA AND WITHOUT STATUS MIGRAINOSUS, NOT INTRACTABLE: ICD-10-CM

## 2024-09-20 DIAGNOSIS — F07.81 POST CONCUSSION SYNDROME: ICD-10-CM

## 2024-09-20 PROBLEM — F33.1 MAJOR DEPRESSIVE DISORDER, RECURRENT EPISODE, MODERATE DEGREE (H): Status: ACTIVE | Noted: 2023-09-29

## 2024-09-20 PROBLEM — G95.9 CERVICAL MYELOPATHY (H): Status: RESOLVED | Noted: 2017-01-04 | Resolved: 2024-09-20

## 2024-09-20 PROCEDURE — 99205 OFFICE O/P NEW HI 60 MIN: CPT | Performed by: PSYCHIATRY & NEUROLOGY

## 2024-09-20 PROCEDURE — G2211 COMPLEX E/M VISIT ADD ON: HCPCS | Performed by: PSYCHIATRY & NEUROLOGY

## 2024-09-20 RX ORDER — LORAZEPAM 1 MG/1
TABLET ORAL
Qty: 1 TABLET | Refills: 0 | Status: SHIPPED | OUTPATIENT
Start: 2024-09-20

## 2024-09-20 RX ORDER — PROPRANOLOL HCL 60 MG
60 CAPSULE, EXTENDED RELEASE 24HR ORAL DAILY
Qty: 90 CAPSULE | Refills: 3 | Status: SHIPPED | OUTPATIENT
Start: 2024-09-20

## 2024-09-20 RX ORDER — TOPIRAMATE 50 MG/1
50 TABLET, FILM COATED ORAL 2 TIMES DAILY
Qty: 180 TABLET | Refills: 3 | Status: SHIPPED | OUTPATIENT
Start: 2024-10-21

## 2024-09-20 RX ORDER — TOPIRAMATE 25 MG/1
TABLET, FILM COATED ORAL
Qty: 70 TABLET | Refills: 0 | Status: SHIPPED | OUTPATIENT
Start: 2024-09-20 | End: 2024-10-20
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
## 2024-09-20 NOTE — PATIENT INSTRUCTIONS
START with Topiramate 25mg   Morning Evening   Week 1   1 tab    Week 2  1 tab AM 1 tab PM   Week 3  1 tab AM 2 tabs PM   Week 4  2 tabs AM 2 tabs PM     Week 5 and ongoing therapy will be Topiramate 50mg by mouth twice a day  A prescription will automatically send to your pharmacy 30 days from your office visit
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
## 2024-09-23 ENCOUNTER — THERAPY VISIT (OUTPATIENT)
Dept: PHYSICAL THERAPY | Facility: CLINIC | Age: 59
End: 2024-09-23
Payer: COMMERCIAL

## 2024-09-23 ENCOUNTER — THERAPY VISIT (OUTPATIENT)
Dept: OCCUPATIONAL THERAPY | Facility: CLINIC | Age: 59
End: 2024-09-23
Payer: COMMERCIAL

## 2024-09-23 DIAGNOSIS — S06.0X0A CONCUSSION WITHOUT LOSS OF CONSCIOUSNESS, INITIAL ENCOUNTER: Primary | ICD-10-CM

## 2024-09-23 DIAGNOSIS — Z78.9 DECREASED ACTIVITIES OF DAILY LIVING (ADL): ICD-10-CM

## 2024-09-23 DIAGNOSIS — M54.2 NECK PAIN: ICD-10-CM

## 2024-09-23 DIAGNOSIS — Z78.9 IMPAIRED INSTRUMENTAL ACTIVITIES OF DAILY LIVING (IADL): ICD-10-CM

## 2024-09-23 PROCEDURE — 97112 NEUROMUSCULAR REEDUCATION: CPT | Mod: GP

## 2024-09-23 PROCEDURE — 97110 THERAPEUTIC EXERCISES: CPT | Mod: GO | Performed by: OCCUPATIONAL THERAPIST

## 2024-09-23 PROCEDURE — 97535 SELF CARE MNGMENT TRAINING: CPT | Mod: GO | Performed by: OCCUPATIONAL THERAPIST

## 2024-09-23 PROCEDURE — 97110 THERAPEUTIC EXERCISES: CPT | Mod: GP

## 2024-10-13 ENCOUNTER — HEALTH MAINTENANCE LETTER (OUTPATIENT)
Age: 59
End: 2024-10-13

## 2024-10-14 ENCOUNTER — THERAPY VISIT (OUTPATIENT)
Dept: OCCUPATIONAL THERAPY | Facility: CLINIC | Age: 59
End: 2024-10-14
Payer: COMMERCIAL

## 2024-10-14 DIAGNOSIS — Z78.9 IMPAIRED INSTRUMENTAL ACTIVITIES OF DAILY LIVING (IADL): ICD-10-CM

## 2024-10-14 DIAGNOSIS — Z78.9 DECREASED ACTIVITIES OF DAILY LIVING (ADL): Primary | ICD-10-CM

## 2024-10-14 DIAGNOSIS — S06.0X0A CONCUSSION WITHOUT LOSS OF CONSCIOUSNESS, INITIAL ENCOUNTER: ICD-10-CM

## 2024-10-14 PROCEDURE — 97535 SELF CARE MNGMENT TRAINING: CPT | Mod: GO | Performed by: OCCUPATIONAL THERAPIST

## 2024-10-22 ENCOUNTER — THERAPY VISIT (OUTPATIENT)
Dept: OCCUPATIONAL THERAPY | Facility: CLINIC | Age: 59
End: 2024-10-22
Payer: COMMERCIAL

## 2024-10-22 DIAGNOSIS — Z78.9 IMPAIRED INSTRUMENTAL ACTIVITIES OF DAILY LIVING (IADL): ICD-10-CM

## 2024-10-22 DIAGNOSIS — Z78.9 DECREASED ACTIVITIES OF DAILY LIVING (ADL): Primary | ICD-10-CM

## 2024-10-22 DIAGNOSIS — S06.0X0A CONCUSSION WITHOUT LOSS OF CONSCIOUSNESS, INITIAL ENCOUNTER: ICD-10-CM

## 2024-10-22 PROCEDURE — 97535 SELF CARE MNGMENT TRAINING: CPT | Mod: GO | Performed by: OCCUPATIONAL THERAPIST

## 2024-11-04 ENCOUNTER — THERAPY VISIT (OUTPATIENT)
Dept: OCCUPATIONAL THERAPY | Facility: CLINIC | Age: 59
End: 2024-11-04
Payer: COMMERCIAL

## 2024-11-04 DIAGNOSIS — S06.0X0A CONCUSSION WITHOUT LOSS OF CONSCIOUSNESS, INITIAL ENCOUNTER: ICD-10-CM

## 2024-11-04 DIAGNOSIS — Z78.9 DECREASED ACTIVITIES OF DAILY LIVING (ADL): Primary | ICD-10-CM

## 2024-11-04 DIAGNOSIS — Z78.9 IMPAIRED INSTRUMENTAL ACTIVITIES OF DAILY LIVING (IADL): ICD-10-CM

## 2024-11-04 PROCEDURE — 97535 SELF CARE MNGMENT TRAINING: CPT | Mod: GO | Performed by: OCCUPATIONAL THERAPIST
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
## 2024-12-13 ENCOUNTER — HOSPITAL ENCOUNTER (OUTPATIENT)
Dept: MRI IMAGING | Facility: CLINIC | Age: 59
Discharge: HOME | End: 2024-12-13
Attending: PSYCHIATRY & NEUROLOGY | Admitting: PSYCHIATRY & NEUROLOGY
Payer: COMMERCIAL

## 2024-12-13 DIAGNOSIS — M47.812 CERVICAL SPONDYLOSIS WITHOUT MYELOPATHY: ICD-10-CM

## 2024-12-13 DIAGNOSIS — F07.81 POST CONCUSSION SYNDROME: ICD-10-CM

## 2024-12-13 PROCEDURE — 72141 MRI NECK SPINE W/O DYE: CPT

## 2024-12-13 PROCEDURE — 70551 MRI BRAIN STEM W/O DYE: CPT

## 2024-12-16 ENCOUNTER — OFFICE VISIT (OUTPATIENT)
Dept: NEUROLOGY | Facility: CLINIC | Age: 59
End: 2024-12-16
Payer: COMMERCIAL

## 2024-12-16 VITALS
WEIGHT: 230 LBS | HEIGHT: 68 IN | HEART RATE: 61 BPM | BODY MASS INDEX: 34.86 KG/M2 | SYSTOLIC BLOOD PRESSURE: 130 MMHG | DIASTOLIC BLOOD PRESSURE: 77 MMHG

## 2024-12-16 DIAGNOSIS — F07.81 POST CONCUSSION SYNDROME: Primary | ICD-10-CM

## 2024-12-16 DIAGNOSIS — M47.812 CERVICAL SPONDYLOSIS WITHOUT MYELOPATHY: ICD-10-CM

## 2024-12-16 PROCEDURE — 99214 OFFICE O/P EST MOD 30 MIN: CPT | Performed by: PSYCHIATRY & NEUROLOGY

## 2024-12-16 PROCEDURE — G2211 COMPLEX E/M VISIT ADD ON: HCPCS | Performed by: PSYCHIATRY & NEUROLOGY

## 2024-12-16 NOTE — NURSING NOTE
Chief Complaint   Patient presents with    Postconcussion syndrome     3 month follow up- Inderal and Topamax has helped minimize frequency of headaches      Yi OLSON CMA on 12/16/2024 at 8:57 AM  [facility]
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
## 2025-07-09 ENCOUNTER — HOSPITAL ENCOUNTER (OUTPATIENT)
Dept: MAMMOGRAPHY | Facility: CLINIC | Age: 60
Discharge: HOME | End: 2025-07-09
Attending: INTERNAL MEDICINE
Payer: COMMERCIAL

## 2025-07-09 DIAGNOSIS — Z12.31 VISIT FOR SCREENING MAMMOGRAM: ICD-10-CM

## 2025-07-09 PROCEDURE — 77063 BREAST TOMOSYNTHESIS BI: CPT
